# Patient Record
Sex: FEMALE | Race: BLACK OR AFRICAN AMERICAN | NOT HISPANIC OR LATINO | Employment: OTHER | URBAN - METROPOLITAN AREA
[De-identification: names, ages, dates, MRNs, and addresses within clinical notes are randomized per-mention and may not be internally consistent; named-entity substitution may affect disease eponyms.]

---

## 2023-10-16 ENCOUNTER — OFFICE VISIT (OUTPATIENT)
Dept: URGENT CARE | Facility: CLINIC | Age: 88
End: 2023-10-16
Payer: MEDICARE

## 2023-10-16 VITALS
BODY MASS INDEX: 21.66 KG/M2 | SYSTOLIC BLOOD PRESSURE: 212 MMHG | RESPIRATION RATE: 17 BRPM | HEIGHT: 65 IN | TEMPERATURE: 98 F | HEART RATE: 73 BPM | OXYGEN SATURATION: 95 % | WEIGHT: 130 LBS | DIASTOLIC BLOOD PRESSURE: 75 MMHG

## 2023-10-16 DIAGNOSIS — R10.32 LEFT GROIN PAIN: Primary | ICD-10-CM

## 2023-10-16 DIAGNOSIS — M24.7 PROTRUSIO ACETABULI DETERMINED BY X-RAY: ICD-10-CM

## 2023-10-16 DIAGNOSIS — N18.32 STAGE 3B CHRONIC KIDNEY DISEASE: ICD-10-CM

## 2023-10-16 DIAGNOSIS — Z98.890 S/P HIP ARTHROSCOPY: ICD-10-CM

## 2023-10-16 LAB
BILIRUB UR QL STRIP: NEGATIVE
GLUCOSE UR QL STRIP: NEGATIVE
KETONES UR QL STRIP: POSITIVE
LEUKOCYTE ESTERASE UR QL STRIP: NEGATIVE
PH, POC UA: 5.5 (ref 5–8)
POC BLOOD, URINE: NEGATIVE
POC NITRATES, URINE: NEGATIVE
PROT UR QL STRIP: NEGATIVE
SP GR UR STRIP: 1.01 (ref 1–1.03)
UROBILINOGEN UR STRIP-ACNC: ABNORMAL (ref 0.1–1.1)

## 2023-10-16 PROCEDURE — 81003 POCT URINALYSIS, DIPSTICK, AUTOMATED, W/O SCOPE: ICD-10-PCS | Mod: QW,S$GLB,, | Performed by: FAMILY MEDICINE

## 2023-10-16 PROCEDURE — 73502 X-RAY EXAM HIP UNI 2-3 VIEWS: CPT | Mod: LT,S$GLB,, | Performed by: RADIOLOGY

## 2023-10-16 PROCEDURE — 73502 XR HIP WITH PELVIS WHEN PERFORMED, 2 OR 3 VIEWS LEFT: ICD-10-PCS | Mod: LT,S$GLB,, | Performed by: RADIOLOGY

## 2023-10-16 PROCEDURE — 81003 URINALYSIS AUTO W/O SCOPE: CPT | Mod: QW,S$GLB,, | Performed by: FAMILY MEDICINE

## 2023-10-16 PROCEDURE — 99204 OFFICE O/P NEW MOD 45 MIN: CPT | Mod: S$GLB,,, | Performed by: FAMILY MEDICINE

## 2023-10-16 PROCEDURE — 99204 PR OFFICE/OUTPT VISIT, NEW, LEVL IV, 45-59 MIN: ICD-10-PCS | Mod: S$GLB,,, | Performed by: FAMILY MEDICINE

## 2023-10-16 RX ORDER — METHOCARBAMOL 500 MG/1
500 TABLET, FILM COATED ORAL NIGHTLY PRN
Qty: 10 TABLET | Refills: 0 | Status: SHIPPED | OUTPATIENT
Start: 2023-10-16

## 2023-10-16 RX ORDER — METOPROLOL TARTRATE 100 MG/1
100 TABLET ORAL
COMMUNITY
Start: 2023-08-09

## 2023-10-16 RX ORDER — TRAMADOL HYDROCHLORIDE 50 MG/1
50 TABLET ORAL 4 TIMES DAILY
COMMUNITY
Start: 2023-10-06

## 2023-10-16 RX ORDER — LOSARTAN POTASSIUM AND HYDROCHLOROTHIAZIDE 25; 100 MG/1; MG/1
1 TABLET ORAL
COMMUNITY
Start: 2023-09-07

## 2023-10-16 NOTE — PATIENT INSTRUCTIONS
Follow up with primary care provider in 5 days.    Seek immediate care in the emergency room in the event of severe groin/ hip pain, chest pain, respiratory distress, fever unresponsive to antipyretic, dehydration, loss of consciousness, seizure.

## 2023-10-16 NOTE — PROGRESS NOTES
"Subjective:      Patient ID: Soniya Alonzo is a 89 y.o. female.    Vitals:  height is 5' 5" (1.651 m) and weight is 59 kg (130 lb). Her oral temperature is 98.2 °F (36.8 °C). Her blood pressure is 212/75 (abnormal) and her pulse is 73. Her respiration is 17 and oxygen saturation is 95%.     Chief Complaint: Groin Pain    Pt. Presents c.o. of left groin pain. Symps include aching. Symps began 2 weeks ago.    Groin Pain  The patient's primary symptoms include pelvic pain. The current episode started 1 to 4 weeks ago. The problem occurs constantly. The pain is mild. The problem affects the left side. Pertinent negatives include no abdominal pain, back pain, headaches, joint pain or rash. The symptoms are aggravated by activity. She has tried heating pads (ibuprofen) for the symptoms. The treatment provided mild relief. She uses nothing for contraception. She is postmenopausal. There is no history of an abdominal surgery, a gynecological surgery, menorrhagia or metrorrhagia.       Gastrointestinal:  Negative for abdominal pain and history of abdominal surgery.   Genitourinary:  Positive for pelvic pain.   Musculoskeletal:  Negative for back pain.   Skin:  Negative for rash.   Neurological:  Negative for headaches.      Objective:     Vitals:    10/16/23 1617   BP: (!) 212/75  Comment: Pt. NOT TAKE BP medicine today   BP Location: Right arm   Patient Position: Sitting   BP Method: Small (Automatic)   Pulse: 73   Resp: 17   Temp: 98.2 °F (36.8 °C)   TempSrc: Oral   SpO2: 95%   Weight: 59 kg (130 lb)   Height: 5' 5" (1.651 m)      Physical Exam   Constitutional: She is oriented to person, place, and time.   Cardiovascular: Normal rate, regular rhythm, normal heart sounds and normal pulses.   Pulmonary/Chest: Effort normal and breath sounds normal.   Musculoskeletal: Normal range of motion.         General: Tenderness (left hip) present. No swelling, deformity or signs of injury. Normal range of motion.      Right " lower leg: No edema.      Left lower leg: No edema.      Comments: Peripheral pulses intact   Neurological: She is alert and oriented to person, place, and time. No sensory deficit. Gait (walks with walker) abnormal.   Psychiatric: Thought content normal.     X-Ray Hip 2 or 3 views Left (with Pelvis when performed)    Result Date: 10/16/2023  EXAMINATION: TWO VIEWS OF THE LEFT HIP CLINICAL HISTORY: Left lower quadrant pain TECHNIQUE: AP and lateral view of the left hip COMPARISON: None. FINDINGS: Two views of the left hip demonstrate no acute fracture.  There is mild degenerative change of the right hip.  There is intrapelvic displacement of the medial wall of the left acetabulum.  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  Although the right hip has no hardware, there are early signs of intrapelvic displacement of the medial wall of the right acetabulum.  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 Electronically signed by: Nancy Leong Date:    10/16/2023 Time:    17:49     Results for orders placed or performed in visit on 10/16/23   POCT Urinalysis, Dipstick, Automated, W/O Scope   Result Value Ref Range    POC Blood, Urine Negative Negative    POC Bilirubin, Urine Negative Negative    POC Urobilinogen, Urine norm 0.1 - 1.1    POC Ketones, Urine Positive (A) Negative    POC Protein, Urine Negative Negative    POC Nitrates, Urine Negative Negative    POC Glucose, Urine Negative Negative    pH, UA 5.5 5 - 8    POC Specific Gravity, Urine 1.015 1.003 - 1.029    POC Leukocytes, Urine Negative Negative       Assessment:     1. 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 S/P hip arthroscopy    4. Stage 3b chronic kidney disease        Plan:       Left groin pain  -     X-Ray Hip 2 or 3 views Left (with Pelvis when performed); Future; Expected date: 10/16/2023  -     POCT Urinalysis, Dipstick, Automated, W/O Scope  -     methocarbamoL (ROBAXIN) 500 MG Tab;  Take 1 tablet (500 mg total) by mouth nightly as needed (muscle pain). Do not engage in activities which require full cognitive function if this medication makes you drowsy.  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 S/P hip arthroscopy  See you orthopedic doctor.    4. Stage 3b chronic kidney disease  Avoid NSAIDs.  I have reviewed external results: abnormal kidney function in 2021.    Patient Instructions   Follow up with primary care provider in 5 days.    Seek immediate care in the emergency room in the event of severe groin/ hip pain, chest pain, respiratory distress, fever unresponsive to antipyretic, dehydration, loss of consciousness, seizure.